# Patient Record
Sex: FEMALE
[De-identification: names, ages, dates, MRNs, and addresses within clinical notes are randomized per-mention and may not be internally consistent; named-entity substitution may affect disease eponyms.]

---

## 2022-03-05 ENCOUNTER — NURSE TRIAGE (OUTPATIENT)
Dept: OTHER | Facility: CLINIC | Age: 62
End: 2022-03-05

## 2022-03-05 NOTE — TELEPHONE ENCOUNTER
Subjective: Caller states \"I've had laryngitis for one week. I had amoxicillin at home and took it, starting last Sunday (2/27/22) and began to feel better, but then on Friday (3/4/22) I began to feel terrible, even worse than before. \"     Current Symptoms: chest congestion w/yellow and green phlegm, sinus congestion, sore throat, ear ache that is very painful    Onset: one week ago    Pain Severity: moderate to severe and pain comes and goes, hurts worse in the am.    Temperature: off and on low grade    What has been tried: OTC and some prescribed meds      Recommended disposition: be seen by Provider within 24 hours. Care advice provided, patient verbalizes understanding; denies any other questions or concerns; instructed to call back for any new or worsening symptoms. [employer] [facility] [facility])    This triage is a result of a call to [employer]. Please do not respond to the triage nurse through this encounter. Any subsequent communication should be directly with the patient.       Reason for Disposition   Earache also present    Protocols used: SORE THROAT-ADULT-